# Patient Record
Sex: MALE | Race: BLACK OR AFRICAN AMERICAN | URBAN - METROPOLITAN AREA
[De-identification: names, ages, dates, MRNs, and addresses within clinical notes are randomized per-mention and may not be internally consistent; named-entity substitution may affect disease eponyms.]

---

## 2018-07-27 ENCOUNTER — OFFICE VISIT (OUTPATIENT)
Dept: URGENT CARE | Facility: URGENT CARE | Age: 53
End: 2018-07-27

## 2018-07-27 VITALS
WEIGHT: 240 LBS | RESPIRATION RATE: 16 BRPM | OXYGEN SATURATION: 98 % | HEART RATE: 70 BPM | DIASTOLIC BLOOD PRESSURE: 79 MMHG | TEMPERATURE: 98.4 F | SYSTOLIC BLOOD PRESSURE: 120 MMHG

## 2018-07-27 DIAGNOSIS — E11.9 NEW ONSET TYPE 2 DIABETES MELLITUS (H): Primary | ICD-10-CM

## 2018-07-27 DIAGNOSIS — R53.83 FATIGUE, UNSPECIFIED TYPE: ICD-10-CM

## 2018-07-27 DIAGNOSIS — R51.9 ACUTE NONINTRACTABLE HEADACHE, UNSPECIFIED HEADACHE TYPE: ICD-10-CM

## 2018-07-27 LAB
ALBUMIN UR-MCNC: NEGATIVE MG/DL
APPEARANCE UR: CLEAR
BASOPHILS # BLD AUTO: 0 10E9/L (ref 0–0.2)
BASOPHILS NFR BLD AUTO: 0.3 %
BILIRUB UR QL STRIP: NEGATIVE
COLOR UR AUTO: YELLOW
DIFFERENTIAL METHOD BLD: NORMAL
EOSINOPHIL # BLD AUTO: 0.1 10E9/L (ref 0–0.7)
EOSINOPHIL NFR BLD AUTO: 2.1 %
ERYTHROCYTE [DISTWIDTH] IN BLOOD BY AUTOMATED COUNT: 13 % (ref 10–15)
GLUCOSE BLD-MCNC: 102 MG/DL (ref 70–99)
GLUCOSE UR STRIP-MCNC: NEGATIVE MG/DL
HBA1C MFR BLD: 7.2 % (ref 0–5.6)
HCT VFR BLD AUTO: 43 % (ref 40–53)
HGB BLD-MCNC: 14.7 G/DL (ref 13.3–17.7)
HGB UR QL STRIP: NEGATIVE
KETONES UR STRIP-MCNC: NEGATIVE MG/DL
LEUKOCYTE ESTERASE UR QL STRIP: NEGATIVE
LYMPHOCYTES # BLD AUTO: 2.8 10E9/L (ref 0.8–5.3)
LYMPHOCYTES NFR BLD AUTO: 48.2 %
MCH RBC QN AUTO: 28.9 PG (ref 26.5–33)
MCHC RBC AUTO-ENTMCNC: 34.2 G/DL (ref 31.5–36.5)
MCV RBC AUTO: 85 FL (ref 78–100)
MONOCYTES # BLD AUTO: 0.4 10E9/L (ref 0–1.3)
MONOCYTES NFR BLD AUTO: 6.7 %
NEUTROPHILS # BLD AUTO: 2.5 10E9/L (ref 1.6–8.3)
NEUTROPHILS NFR BLD AUTO: 42.7 %
NITRATE UR QL: NEGATIVE
PH UR STRIP: 6.5 PH (ref 5–7)
PLATELET # BLD AUTO: 254 10E9/L (ref 150–450)
RBC # BLD AUTO: 5.09 10E12/L (ref 4.4–5.9)
SOURCE: NORMAL
SP GR UR STRIP: 1.02 (ref 1–1.03)
UROBILINOGEN UR STRIP-ACNC: 0.2 EU/DL (ref 0.2–1)
WBC # BLD AUTO: 5.9 10E9/L (ref 4–11)

## 2018-07-27 PROCEDURE — 36415 COLL VENOUS BLD VENIPUNCTURE: CPT | Performed by: PHYSICIAN ASSISTANT

## 2018-07-27 PROCEDURE — 81003 URINALYSIS AUTO W/O SCOPE: CPT | Performed by: PHYSICIAN ASSISTANT

## 2018-07-27 PROCEDURE — 82947 ASSAY GLUCOSE BLOOD QUANT: CPT | Performed by: PHYSICIAN ASSISTANT

## 2018-07-27 PROCEDURE — 83036 HEMOGLOBIN GLYCOSYLATED A1C: CPT | Performed by: PHYSICIAN ASSISTANT

## 2018-07-27 PROCEDURE — 84443 ASSAY THYROID STIM HORMONE: CPT | Performed by: PHYSICIAN ASSISTANT

## 2018-07-27 PROCEDURE — 80053 COMPREHEN METABOLIC PANEL: CPT | Performed by: PHYSICIAN ASSISTANT

## 2018-07-27 PROCEDURE — 85025 COMPLETE CBC W/AUTO DIFF WBC: CPT | Performed by: PHYSICIAN ASSISTANT

## 2018-07-27 PROCEDURE — 99204 OFFICE O/P NEW MOD 45 MIN: CPT | Performed by: PHYSICIAN ASSISTANT

## 2018-07-27 NOTE — LETTER
July 30, 2018      Abilio Red  [id number]   [facility] [address]            Dear Abilio Red    Your test results are normal. Normal thyroid function. Normal kidney and liver function function. Proceed as discussed at visit.      Enclosed is a copy of the results.  It was a pleasure to see you at your last appointment.    If you have any questions or concerns, please contact our team at [phone].      Sincerely,    Liv Landa PA-C             Results for orders placed or performed in visit on 07/27/18   CBC with platelets differential   Result Value Ref [facility]    WBC 5.9 4.0 - 11.0 10e9/L    RBC Count 5.09 4.4 - 5.9 10e12/L    Hemoglobin 14.7 13.3 - 17.7 g/dL    Hematocrit 43.0 40.0 - 53.0 %    MCV 85 78 - 100 fl    MCH 28.9 26.5 - 33.0 pg    MCHC 34.2 31.5 - 36.5 g/dL    RDW 13.0 10.0 - 15.0 %    Platelet Count 254 150 - 450 10e9/L    Diff Method Automated Method     % Neutrophils 42.7 %    % Lymphocytes 48.2 %    % Monocytes 6.7 %    % Eosinophils 2.1 %    % Basophils 0.3 %    Absolute Neutrophil 2.5 1.6 - 8.3 10e9/L    Absolute Lymphocytes 2.8 0.8 - 5.3 10e9/L    Absolute Monocytes 0.4 0.0 - 1.3 10e9/L    Absolute Eosinophils 0.1 0.0 - 0.7 10e9/L    Absolute Basophils 0.0 0.0 - 0.2 10e9/L   Comprehensive metabolic panel   Result Value Ref [facility]    Sodium 141 133 - 144 mmol/L    Potassium 4.3 3.4 - 5.3 mmol/L    Chloride 106 94 - 109 mmol/L    Carbon Dioxide 27 20 - 32 mmol/L    Anion Gap 8 3 - 14 mmol/L    Glucose 107 (H) 70 - 99 mg/dL    Urea Nitrogen 16 7 - 30 mg/dL    Creatinine 0.99 0.66 - 1.25 mg/dL    GFR Estimate 79 >60 mL/min/1.7m2    GFR Estimate If Black >90 >60 mL/min/1.7m2    Calcium 9.2 8.5 - 10.1 mg/dL    Bilirubin Total 0.2 0.2 - 1.3 mg/dL    Albumin 4.3 3.4 - 5.0 g/dL    Protein Total 7.9 6.8 - 8.8 g/dL    Alkaline Phosphatase 88 40 - 150 U/L    ALT 44 0 - 70 U/L    AST 30 0 - 45 U/L   *UA reflex to Microscopic and Culture ([place] [facility] (except  [place] and [place])   Result Value Ref [facility]    Color Urine Yellow     Appearance Urine Clear     Glucose Urine Negative NEG^Negative mg/dL    Bilirubin Urine Negative NEG^Negative    Ketones Urine Negative NEG^Negative mg/dL    Specific Gravity Urine 1.020 1.003 - 1.035    Blood Urine Negative NEG^Negative    pH Urine 6.5 5.0 - 7.0 pH    Protein Albumin Urine Negative NEG^Negative mg/dL    Urobilinogen Urine 0.2 0.2 - 1.0 EU/dL    Nitrite Urine Negative NEG^Negative    Leukocyte Esterase Urine Negative NEG^Negative    Source Midstream Urine    Glucose, whole blood   Result Value Ref [facility]    Glucose Whole Blood 102 (H) 70 - 99 mg/dL   Hemoglobin A1c   Result Value Ref [facility]    Hemoglobin A1C 7.2 (H) 0 - 5.6 %   TSH with free T4 reflex   Result Value Ref [facility]    TSH 3.63 0.40 - 4.00 mU/L

## 2018-07-27 NOTE — MR AVS SNAPSHOT
After Visit Summary   7/27/2018    Abilio Red    MRN: [id number]           Patient Information     Date Of Birth          1965        Visit Information        Provider Department      7/27/2018 6:05 PM Liv Landa PA-C [facility]        Today's Diagnoses     New onset type 2 diabetes mellitus (H)    -  1    Fatigue, unspecified type        Acute nonintractable headache, unspecified headache type          Care Instructions    Obtain Primary Care Provider here and follow up next weekWhat Is Diabetes?  If you have diabetes, your body does not make enough insulin (a hormone), or the insulin it makes does not work the way it should. Diabetes is a lifelong disease.  Glucose (sugar) is your body's main source of energy. Insulin carries glucose from the bloodstream into your body's cells. But if you have diabetes, glucose builds up in your blood. This is called high blood glucose (high blood sugar).  High blood glucose can lead to damage in many parts of your body, including your eyes, kidneys, heart, blood vessels, nerves and skin.  What are the symptoms of diabetes?  Symptoms include:    Extreme thirst    Needing to urinate more often    Headache    Hunger    Blurred vision    Feeling drowsy or tired    Slow healing after an illness or injury    Frequent infections.  What should I do if I have diabetes?  Your first step is to learn how to manage your diabetes. The goal is to keep your blood glucose as close to normal as possible. (A normal level is 70 to 100.) By doing this, you can prevent or control damage to your body.  To manage your diabetes, you will need to:    Eat a wide range of healthy foods.    Manage your weight.    Be physically active.    Test your blood glucose as prescribed.    Control your blood pressure and cholesterol.    Take your medicines as prescribed.  Are there different kinds of diabetes?  There are two basic kinds of diabetes: type 1 and type  2.  Type 1 diabetes  Type 1 diabetes occurs when the cells that make insulin are destroyed by your body's immune system. Your body can no longer make insulin on its own. People who have type 1 diabetes must take insulin to manage it.  Type 2 diabetes  Type 2 diabetes occurs when the cells of your body cannot use insulin or glucose normally. Over time, your body cannot make enough insulin to meet your body's needs. This is the most common kind of diabetes.  You are more likely to develop type 2 diabetes if you:    Are overweight    Have high blood pressure    Have high cholesterol    Are not physically active    Have a family history of type 2 diabetes    Are , /, ,  American or     Are a woman who has given birth to a baby weighing over 9 pounds, or you have had gestational diabetes (diabetes that occurs in pregnancy).  For informational purposes only. Not to replace the advice of your health care provider.  Copyright   2006 [facility]. All rights reserved. [employer] [id number] - REV 12/15.            Follow-ups after your visit        Who to contact     If you have questions or need follow up information about today's clinic visit or your schedule please contact [facility] directly at [phone].  Normal or non-critical lab and imaging results will be communicated to you by MyChart, letter or phone within 4 business days after the clinic has received the results. If you do not hear from us within 7 days, please contact the clinic through MyChart or phone. If you have a critical or abnormal lab result, we will notify you by phone as soon as possible.  Submit refill requests through [employer] or call your pharmacy and they will forward the refill request to us. Please allow 3 business days for your refill to be completed.          Additional Information About Your Visit        Care EveryWhere ID     This is your Care  EveryWhere ID. This could be used by other organizations to access your [place] medical records  [id number]        Your Vitals Were     Pulse Temperature Respirations Pulse Oximetry          70 98.4  F (36.9  C) (Oral) 16 98%         Blood Pressure from Last 3 Encounters:   07/27/18 120/79    Weight from Last 3 Encounters:   07/27/18 240 lb (108.9 kg)              We Performed the Following     *UA reflex to Microscopic and Culture ([place] and [facility] (except [place] and [place])     CBC with platelets differential     Comprehensive metabolic panel     Glucose, whole blood     Hemoglobin A1c     TSH with free T4 reflex        Primary Care Provider Fax #    Physician No Ref-Primary [phone]       No address on file        Equal Access to Services     STEFANI FRANCO : Randy Oseguera, brian cochran, chauncey kaalmada lorin, mya izaguirre . So [facility] [phone].    ATENCIÓN: Si habla español, tiene a godoy disposición servicios gratuitos de asistencia lingüística. Llame al [phone].    We comply with applicable federal civil rights laws and [place] laws. We do not discriminate on the basis of race, color, national origin, age, disability, sex, sexual orientation, or gender identity.            Thank you!     Thank you for choosing [facility]  for your care. Our goal is always to provide you with excellent care. Hearing back from our patients is one way we can continue to improve our services. Please take a few minutes to complete the written survey that you may receive in the mail after your visit with us. Thank you!             Your Updated Medication List - Protect others around you: Learn how to safely use, store and throw away your medicines at www.disposemymeds.org.      Notice  As of 7/27/2018  7:07 PM    You have not been prescribed any medications.

## 2018-07-27 NOTE — PROGRESS NOTES
S: 53-year-old male presents for evaluation of fatigue, right parietal headache, sleepiness while driving, blurry vision at times and some tingling in his feet.  Some of these symptoms have been going on for a few months.  The headache just occurred today for the first time.  He denies nausea, vomiting, constipation, diarrhea, chest pain, shortness of breath, abdominal pain.  He did attempt yesterday to go to the [facility] but something was wrong with his insurance.  He states that he did have testing recently for a life insurance policy and was told that there was something wrong with his blood sugar.  He has been in the [place] for 8 months and has not seen a medical provider in that time.  No cough or sore throat.         No Known Allergies    History reviewed. No pertinent past medical history.  He denies any history of diabetes.    FMHX- Mom with diabetes    No current outpatient prescriptions on file prior to visit.  No current facility-administered medications on file prior to visit.     Social History   Substance Use Topics     Smoking status: Never Smoker     Smokeless tobacco: Never Used     Alcohol use Not on file       ROS:  CONSTITUTIONAL: Negative for  fever.  EYES: as above  ENT: neg As above.  RESP: neg  As above.  CV: Negative for chest pains.  GI: Negative for vomiting.  MUSCULOSKELETAL:  Negative for significant muscle or joint pains.  NEUROLOGIC: Positive for headaches.  SKIN: Negative for rash.    OBJECTIVE:  /79 (BP Location: Left arm, Patient Position: Left side, Cuff Size: Adult Large)  Pulse 70  Temp 98.4  F (36.9  C) (Oral)  Resp 16  Wt 240 lb (108.9 kg)  SpO2 98%  GENERAL APPEARANCE: Healthy, alert and no distress.  EYES:Conjunctiva/sclera clear.  Pupils equal reactive to light and accommodation.  EOMs intact.  Funduscopic exam grossly benign today  EARS: No cerumen.   Ear canals w/o erythema.  TM's intact w/o erythema.    NOSE/MOUTH: Nose without  ulcers, erythema or lesions.  SINUSES: No maxillary sinus tenderness.  THROAT: No erythema w/o tonsillar enlargement . No exudates.  NECK: Supple, nontender, no lymphadenopathy.  RESP: Lungs clear to auscultation - no rales, rhonchi or wheezes  CV: Regular rate and rhythm, normal S1 S2, no murmur noted.  NEURO: Awake, alert    SKIN: No rashes  Cranial nerves II through XII grossly intact.  Negative pronator drift.  No slurred speech.  Says his headache is in the right parietal area but this area is nontender to palpation.  He does report some pain in this area with movement of his neck.      ASSESSMENT:     ICD-10-CM    1. New onset type 2 diabetes mellitus (H) E11.9    2. Fatigue, unspecified type R53.83 CBC with platelets differential     Comprehensive metabolic panel     *UA reflex to Microscopic and Culture ([place] [facility] (except [place] and [place])     Glucose, whole blood     Hemoglobin A1c     TSH with free T4 reflex   3. Acute nonintractable headache, unspecified headache type R51 CBC with platelets differential     Comprehensive metabolic panel     *UA reflex to Microscopic and Culture ([facility] (except [place] and [place])     Glucose, whole blood     Hemoglobin A1c     TSH with free T4 reflex           PLAN:  Patient Instructions   Obtain Primary Care Provider here and follow up next weekWhat Is Diabetes?  If you have diabetes, your body does not make enough insulin (a hormone), or the insulin it makes does not work the way it should. Diabetes is a lifelong disease.  Glucose (sugar) is your body's main source of energy. Insulin carries glucose from the bloodstream into your body's cells. But if you have diabetes, glucose builds up in your blood. This is called high blood glucose (high blood sugar).  High blood glucose can lead to damage in many parts of your body, including your eyes, kidneys, heart, blood vessels, nerves and skin.  What are the symptoms of  diabetes?  Symptoms include:    Extreme thirst    Needing to urinate more often    Headache    Hunger    Blurred vision    Feeling drowsy or tired    Slow healing after an illness or injury    Frequent infections.  What should I do if I have diabetes?  Your first step is to learn how to manage your diabetes. The goal is to keep your blood glucose as close to normal as possible. (A normal level is 70 to 100.) By doing this, you can prevent or control damage to your body.  To manage your diabetes, you will need to:    Eat a wide range of healthy foods.    Manage your weight.    Be physically active.    Test your blood glucose as prescribed.    Control your blood pressure and cholesterol.    Take your medicines as prescribed.  Are there different kinds of diabetes?  There are two basic kinds of diabetes: type 1 and type 2.  Type 1 diabetes  Type 1 diabetes occurs when the cells that make insulin are destroyed by your body's immune system. Your body can no longer make insulin on its own. People who have type 1 diabetes must take insulin to manage it.  Type 2 diabetes  Type 2 diabetes occurs when the cells of your body cannot use insulin or glucose normally. Over time, your body cannot make enough insulin to meet your body's needs. This is the most common kind of diabetes.  You are more likely to develop type 2 diabetes if you:    Are overweight    Have high blood pressure    Have high cholesterol    Are not physically active    Have a family history of type 2 diabetes    Are , /, ,  American or     Are a woman who has given birth to a baby weighing over 9 pounds, or you have had gestational diabetes (diabetes that occurs in pregnancy).  For informational purposes only. Not to replace the advice of your health care provider.  Copyright   2006 [employer] [facility]. All rights reserved. [employer] [id number] - REV 12/15.          Liv Landa PA-C

## 2018-07-28 LAB
ALBUMIN SERPL-MCNC: 4.3 G/DL (ref 3.4–5)
ALP SERPL-CCNC: 88 U/L (ref 40–150)
ALT SERPL W P-5'-P-CCNC: 44 U/L (ref 0–70)
ANION GAP SERPL CALCULATED.3IONS-SCNC: 8 MMOL/L (ref 3–14)
AST SERPL W P-5'-P-CCNC: 30 U/L (ref 0–45)
BILIRUB SERPL-MCNC: 0.2 MG/DL (ref 0.2–1.3)
BUN SERPL-MCNC: 16 MG/DL (ref 7–30)
CALCIUM SERPL-MCNC: 9.2 MG/DL (ref 8.5–10.1)
CHLORIDE SERPL-SCNC: 106 MMOL/L (ref 94–109)
CO2 SERPL-SCNC: 27 MMOL/L (ref 20–32)
CREAT SERPL-MCNC: 0.99 MG/DL (ref 0.66–1.25)
GFR SERPL CREATININE-BSD FRML MDRD: 79 ML/MIN/1.7M2
GLUCOSE SERPL-MCNC: 107 MG/DL (ref 70–99)
POTASSIUM SERPL-SCNC: 4.3 MMOL/L (ref 3.4–5.3)
PROT SERPL-MCNC: 7.9 G/DL (ref 6.8–8.8)
SODIUM SERPL-SCNC: 141 MMOL/L (ref 133–144)
TSH SERPL DL<=0.005 MIU/L-ACNC: 3.63 MU/L (ref 0.4–4)

## 2018-07-28 NOTE — PATIENT INSTRUCTIONS
Obtain Primary Care Provider here and follow up next weekWhat Is Diabetes?  If you have diabetes, your body does not make enough insulin (a hormone), or the insulin it makes does not work the way it should. Diabetes is a lifelong disease.  Glucose (sugar) is your body's main source of energy. Insulin carries glucose from the bloodstream into your body's cells. But if you have diabetes, glucose builds up in your blood. This is called high blood glucose (high blood sugar).  High blood glucose can lead to damage in many parts of your body, including your eyes, kidneys, heart, blood vessels, nerves and skin.  What are the symptoms of diabetes?  Symptoms include:    Extreme thirst    Needing to urinate more often    Headache    Hunger    Blurred vision    Feeling drowsy or tired    Slow healing after an illness or injury    Frequent infections.  What should I do if I have diabetes?  Your first step is to learn how to manage your diabetes. The goal is to keep your blood glucose as close to normal as possible. (A normal level is 70 to 100.) By doing this, you can prevent or control damage to your body.  To manage your diabetes, you will need to:    Eat a wide range of healthy foods.    Manage your weight.    Be physically active.    Test your blood glucose as prescribed.    Control your blood pressure and cholesterol.    Take your medicines as prescribed.  Are there different kinds of diabetes?  There are two basic kinds of diabetes: type 1 and type 2.  Type 1 diabetes  Type 1 diabetes occurs when the cells that make insulin are destroyed by your body's immune system. Your body can no longer make insulin on its own. People who have type 1 diabetes must take insulin to manage it.  Type 2 diabetes  Type 2 diabetes occurs when the cells of your body cannot use insulin or glucose normally. Over time, your body cannot make enough insulin to meet your body's needs. This is the most common kind of diabetes.  You are more likely  to develop type 2 diabetes if you:    Are overweight    Have high blood pressure    Have high cholesterol    Are not physically active    Have a family history of type 2 diabetes    Are , /, ,  American or     Are a woman who has given birth to a baby weighing over 9 pounds, or you have had gestational diabetes (diabetes that occurs in pregnancy).  For informational purposes only. Not to replace the advice of your health care provider.  Copyright   2006 [facility]. All rights reserved. [employer] [id number] - REV 12/15.